# Patient Record
Sex: FEMALE | Race: WHITE
[De-identification: names, ages, dates, MRNs, and addresses within clinical notes are randomized per-mention and may not be internally consistent; named-entity substitution may affect disease eponyms.]

---

## 2019-03-07 ENCOUNTER — HOSPITAL ENCOUNTER (INPATIENT)
Dept: HOSPITAL 95 - OBS | Age: 29
LOS: 2 days | Discharge: HOME | End: 2019-03-09
Attending: OBSTETRICS & GYNECOLOGY | Admitting: OBSTETRICS & GYNECOLOGY
Payer: COMMERCIAL

## 2019-03-07 VITALS — WEIGHT: 0.44 LBS | BODY MASS INDEX: 0.08 KG/M2 | HEIGHT: 64.02 IN

## 2019-03-07 DIAGNOSIS — Z3A.39: ICD-10-CM

## 2019-03-07 DIAGNOSIS — O32.8XX0: Primary | ICD-10-CM

## 2019-03-07 LAB
BASOPHILS # BLD AUTO: 0.03 K/MM3 (ref 0–0.23)
BASOPHILS NFR BLD AUTO: 0 % (ref 0–2)
DEPRECATED RDW RBC AUTO: 44.9 FL (ref 35.1–46.3)
EOSINOPHIL # BLD AUTO: 0 K/MM3 (ref 0–0.68)
EOSINOPHIL NFR BLD AUTO: 0 % (ref 0–6)
ERYTHROCYTE [DISTWIDTH] IN BLOOD BY AUTOMATED COUNT: 13.9 % (ref 11.7–14.2)
HCT VFR BLD AUTO: 31.1 % (ref 33–51)
HGB BLD-MCNC: 9.7 G/DL (ref 11.5–16)
IMM GRANULOCYTES # BLD AUTO: 0.17 K/MM3 (ref 0–0.1)
IMM GRANULOCYTES NFR BLD AUTO: 1 % (ref 0–1)
LEUKOCYTE ESTERASE UR QL STRIP: (no result)
LYMPHOCYTES # BLD AUTO: 0.55 K/MM3 (ref 0.84–5.2)
LYMPHOCYTES NFR BLD AUTO: 3 % (ref 21–46)
MCHC RBC AUTO-ENTMCNC: 31.2 G/DL (ref 31.5–36.5)
MCV RBC AUTO: 88 FL (ref 80–100)
MONOCYTES # BLD AUTO: 1.16 K/MM3 (ref 0.16–1.47)
MONOCYTES NFR BLD AUTO: 6 % (ref 4–13)
NEUTROPHILS # BLD AUTO: 17.47 K/MM3 (ref 1.96–9.15)
NEUTROPHILS NFR BLD AUTO: 90 % (ref 41–73)
NRBC # BLD AUTO: 0 K/MM3 (ref 0–0.02)
NRBC BLD AUTO-RTO: 0 /100 WBC (ref 0–0.2)
PLATELET # BLD AUTO: 175 K/MM3 (ref 150–400)
SP GR SPEC: 1.01 (ref 1–1.02)
UROBILINOGEN UR STRIP-MCNC: (no result) MG/DL

## 2019-03-08 PROCEDURE — 0UT70ZZ RESECTION OF BILATERAL FALLOPIAN TUBES, OPEN APPROACH: ICD-10-PCS | Performed by: OBSTETRICS & GYNECOLOGY

## 2019-03-08 NOTE — NUR
19 1511 [address]
 MALE DELIVERED AT 1419 VIA PRIMARY  SECTION FOR FETAL
INTOLERANCE TO LABOR. A SALPINGETOMY WAS COMPLETED ALONG WITH 
SECTION PER PT REQUEST. APGARS ARE 9/9, NB WEIGHT WAS 3360 GRAMS, 7
POUNDS EVEN. CORD BLOOD WAS SENT UP TO LAB BY LEONA LORENZANA. FALLOPAIAN TUBES
WERE COLLECTED AND SENT UP TO PATHOLOGY BY LEONA LORENZANA.

## 2019-03-09 LAB
DEPRECATED RDW RBC AUTO: 46.2 FL (ref 35.1–46.3)
ERYTHROCYTE [DISTWIDTH] IN BLOOD BY AUTOMATED COUNT: 14.2 % (ref 11.7–14.2)
HCT VFR BLD AUTO: 29.4 % (ref 33–51)
HGB BLD-MCNC: 9.1 G/DL (ref 11.5–16)
MCHC RBC AUTO-ENTMCNC: 31 G/DL (ref 31.5–36.5)
MCV RBC AUTO: 90 FL (ref 80–100)
NRBC # BLD AUTO: 0 K/MM3 (ref 0–0.02)
NRBC BLD AUTO-RTO: 0 /100 WBC (ref 0–0.2)
PLATELET # BLD AUTO: 193 K/MM3 (ref 150–400)

## 2019-03-09 NOTE — NUR
PT DISCHARGED HOME. NO ACUTE DISTRESS NOTED. DISCHARGE INSTRUCTIONS REVIEWED
WITH PT AND SO, BOTH VERBALIZED UNDERSTANDING AND DENY ANY FURTHER QUESTIONS
OR CONCERNS. IV DC'D WNL. PRESCRIPTION FOR IBUPROFEN AND PERCOCET GIVEN TO PT.